# Patient Record
Sex: MALE | Race: OTHER | NOT HISPANIC OR LATINO | Employment: STUDENT | URBAN - METROPOLITAN AREA
[De-identification: names, ages, dates, MRNs, and addresses within clinical notes are randomized per-mention and may not be internally consistent; named-entity substitution may affect disease eponyms.]

---

## 2023-10-10 VITALS
BODY MASS INDEX: 23.99 KG/M2 | SYSTOLIC BLOOD PRESSURE: 117 MMHG | OXYGEN SATURATION: 98 % | HEIGHT: 65 IN | TEMPERATURE: 98.4 F | RESPIRATION RATE: 16 BRPM | DIASTOLIC BLOOD PRESSURE: 70 MMHG | WEIGHT: 144 LBS | HEART RATE: 98 BPM

## 2023-10-10 PROCEDURE — 96361 HYDRATE IV INFUSION ADD-ON: CPT

## 2023-10-10 PROCEDURE — 99284 EMERGENCY DEPT VISIT MOD MDM: CPT | Performed by: EMERGENCY MEDICINE

## 2023-10-10 PROCEDURE — 96374 THER/PROPH/DIAG INJ IV PUSH: CPT

## 2023-10-10 ASSESSMENT — COLUMBIA-SUICIDE SEVERITY RATING SCALE - C-SSRS
6. HAVE YOU EVER DONE ANYTHING, STARTED TO DO ANYTHING, OR PREPARED TO DO ANYTHING TO END YOUR LIFE?: NO
2. HAVE YOU ACTUALLY HAD ANY THOUGHTS OF KILLING YOURSELF?: NO
1. IN THE PAST MONTH, HAVE YOU WISHED YOU WERE DEAD OR WISHED YOU COULD GO TO SLEEP AND NOT WAKE UP?: NO

## 2023-10-10 ASSESSMENT — PAIN SCALES - GENERAL: PAINLEVEL_OUTOF10: 0 - NO PAIN

## 2023-10-10 ASSESSMENT — PAIN - FUNCTIONAL ASSESSMENT: PAIN_FUNCTIONAL_ASSESSMENT: 0-10

## 2023-10-11 ENCOUNTER — HOSPITAL ENCOUNTER (EMERGENCY)
Facility: HOSPITAL | Age: 23
Discharge: HOME | End: 2023-10-11
Attending: EMERGENCY MEDICINE
Payer: COMMERCIAL

## 2023-10-11 DIAGNOSIS — R11.2 NAUSEA AND VOMITING, UNSPECIFIED VOMITING TYPE: Primary | ICD-10-CM

## 2023-10-11 LAB
ALBUMIN SERPL BCP-MCNC: 4.7 G/DL (ref 3.4–5)
ALP SERPL-CCNC: 88 U/L (ref 33–120)
ALT SERPL W P-5'-P-CCNC: 17 U/L (ref 10–52)
ANION GAP SERPL CALC-SCNC: 12 MMOL/L (ref 10–20)
APPEARANCE UR: CLEAR
AST SERPL W P-5'-P-CCNC: 17 U/L (ref 9–39)
BASOPHILS # BLD AUTO: 0.04 X10*3/UL (ref 0–0.1)
BASOPHILS NFR BLD AUTO: 0.4 %
BILIRUB SERPL-MCNC: 0.5 MG/DL (ref 0–1.2)
BILIRUB UR STRIP.AUTO-MCNC: NEGATIVE MG/DL
BUN SERPL-MCNC: 17 MG/DL (ref 6–23)
CALCIUM SERPL-MCNC: 9.4 MG/DL (ref 8.6–10.3)
CHLORIDE SERPL-SCNC: 105 MMOL/L (ref 98–107)
CO2 SERPL-SCNC: 25 MMOL/L (ref 21–32)
COLOR UR: YELLOW
CREAT SERPL-MCNC: 1.06 MG/DL (ref 0.5–1.3)
EOSINOPHIL # BLD AUTO: 0.19 X10*3/UL (ref 0–0.7)
EOSINOPHIL NFR BLD AUTO: 1.8 %
ERYTHROCYTE [DISTWIDTH] IN BLOOD BY AUTOMATED COUNT: 12.5 % (ref 11.5–14.5)
GFR SERPL CREATININE-BSD FRML MDRD: >90 ML/MIN/1.73M*2
GLUCOSE SERPL-MCNC: 85 MG/DL (ref 74–99)
GLUCOSE UR STRIP.AUTO-MCNC: NEGATIVE MG/DL
HCT VFR BLD AUTO: 44.2 % (ref 41–52)
HGB BLD-MCNC: 15.9 G/DL (ref 13.5–17.5)
HOLD SPECIMEN: NORMAL
IMM GRANULOCYTES # BLD AUTO: 0.03 X10*3/UL (ref 0–0.7)
IMM GRANULOCYTES NFR BLD AUTO: 0.3 % (ref 0–0.9)
KETONES UR STRIP.AUTO-MCNC: NEGATIVE MG/DL
LEUKOCYTE ESTERASE UR QL STRIP.AUTO: NEGATIVE
LIPASE SERPL-CCNC: 41 U/L (ref 9–82)
LYMPHOCYTES # BLD AUTO: 3.94 X10*3/UL (ref 1.2–4.8)
LYMPHOCYTES NFR BLD AUTO: 37 %
MCH RBC QN AUTO: 31.4 PG (ref 26–34)
MCHC RBC AUTO-ENTMCNC: 36 G/DL (ref 32–36)
MCV RBC AUTO: 87 FL (ref 80–100)
MONOCYTES # BLD AUTO: 0.85 X10*3/UL (ref 0.1–1)
MONOCYTES NFR BLD AUTO: 8 %
NEUTROPHILS # BLD AUTO: 5.6 X10*3/UL (ref 1.2–7.7)
NEUTROPHILS NFR BLD AUTO: 52.5 %
NITRITE UR QL STRIP.AUTO: NEGATIVE
NRBC BLD-RTO: 0 /100 WBCS (ref 0–0)
PH UR STRIP.AUTO: 5 [PH]
PLATELET # BLD AUTO: 397 X10*3/UL (ref 150–450)
PMV BLD AUTO: 10.7 FL (ref 7.5–11.5)
POTASSIUM SERPL-SCNC: 3.7 MMOL/L (ref 3.5–5.3)
PROT SERPL-MCNC: 7.2 G/DL (ref 6.4–8.2)
PROT UR STRIP.AUTO-MCNC: NEGATIVE MG/DL
RBC # BLD AUTO: 5.06 X10*6/UL (ref 4.5–5.9)
RBC # UR STRIP.AUTO: NEGATIVE /UL
SODIUM SERPL-SCNC: 138 MMOL/L (ref 136–145)
SP GR UR STRIP.AUTO: 1.03
UROBILINOGEN UR STRIP.AUTO-MCNC: 2 MG/DL
WBC # BLD AUTO: 10.7 X10*3/UL (ref 4.4–11.3)

## 2023-10-11 PROCEDURE — 83690 ASSAY OF LIPASE: CPT | Performed by: EMERGENCY MEDICINE

## 2023-10-11 PROCEDURE — 2580000001 HC RX 258 IV SOLUTIONS: Performed by: EMERGENCY MEDICINE

## 2023-10-11 PROCEDURE — 81003 URINALYSIS AUTO W/O SCOPE: CPT | Performed by: EMERGENCY MEDICINE

## 2023-10-11 PROCEDURE — 2500000004 HC RX 250 GENERAL PHARMACY W/ HCPCS (ALT 636 FOR OP/ED): Performed by: EMERGENCY MEDICINE

## 2023-10-11 PROCEDURE — 36415 COLL VENOUS BLD VENIPUNCTURE: CPT | Performed by: EMERGENCY MEDICINE

## 2023-10-11 PROCEDURE — 84075 ASSAY ALKALINE PHOSPHATASE: CPT | Performed by: EMERGENCY MEDICINE

## 2023-10-11 PROCEDURE — 85025 COMPLETE CBC W/AUTO DIFF WBC: CPT | Performed by: EMERGENCY MEDICINE

## 2023-10-11 PROCEDURE — 80053 COMPREHEN METABOLIC PANEL: CPT | Performed by: EMERGENCY MEDICINE

## 2023-10-11 RX ORDER — ONDANSETRON HYDROCHLORIDE 2 MG/ML
4 INJECTION, SOLUTION INTRAVENOUS ONCE
Status: COMPLETED | OUTPATIENT
Start: 2023-10-11 | End: 2023-10-11

## 2023-10-11 RX ORDER — ONDANSETRON 4 MG/1
4 TABLET, ORALLY DISINTEGRATING ORAL EVERY 8 HOURS PRN
Qty: 15 TABLET | Refills: 0 | Status: SHIPPED | OUTPATIENT
Start: 2023-10-11 | End: 2023-10-21

## 2023-10-11 RX ADMIN — ONDANSETRON 4 MG: 2 INJECTION INTRAMUSCULAR; INTRAVENOUS at 06:26

## 2023-10-11 RX ADMIN — SODIUM CHLORIDE, POTASSIUM CHLORIDE, SODIUM LACTATE AND CALCIUM CHLORIDE 1000 ML: 600; 310; 30; 20 INJECTION, SOLUTION INTRAVENOUS at 05:24

## 2023-10-11 NOTE — ED PROVIDER NOTES
HPI   Chief Complaint   Patient presents with    Vomiting     Pt states he vomited 5 times today, pt states it began when he first woke up. Pt states he can't keep anything down and has been nauseous. Pt states last times he vomited was around 9:30 pm       HISTORY OF PRESENT ILLNESS:  Patient is a 23-year-old male who is otherwise healthy presenting to the emergency department with a chief complaint of nausea and vomiting.  Patient states that he starting at 10 AM had multiple episodes of nonbloody nonbilious emesis, 3 or 4 in a row.  He then had another episode when he attempted p.o.  His last emesis was at 10 PM.  Patient has not taken any medications.  He has had prior ED visits with nausea and vomiting but states this is worse.  He has no abdominal pain but states that he was having some vague pressure.    Past Medical History: Patient denies  Past Surgical History: Patient denies  Family History: family history not pertinent to presenting problem or chief complaint  Social History: Positive for cigarette smoking.  Denies EtOH use or marijuana use or other recreational drugs.    __________________________________________________________  PHYSICAL EXAM:    Appearance: Appears stated age male, alert, oriented , cooperative   Skin: Intact,  dry skin, no lesions, rash, petechiae or purpura.   Eyes: PERRLA, EOMs intact,  Conjunctiva pink with no redness or exudates.    HENT: Normocephalic, atraumatic. Nares patent   Neck: Supple. Trachea at midline.   Pulmonary: Lung sounds are clear bilaterally.  There is no rales, rhonchi, or wheezing.  Cardiac: Regular rate and rhythm, no rubs, murmurs, or gallops. No JVD,   Abdomen: Abdomen is soft, nontender, and nondistended.  No palpable organomegaly.  No rebound or guarding.  No CVA tenderness. Nonsurgical abdomen  Genitourinary: Exam deferred.  Musculoskeletal: no edema, pain, cyanosis, or deformity in extremities. Pulses full and equal.   Neurological:  Cranial nerves are  grossly intact, grossly normal sensation, no weakness, no focal findings identified.    __________________________________________________________  MEDICAL DECISION MAKING:    Patient was seen and examined. Differential diagnosis for nausea and vomiting includes gastritis, pancreatitis, cyclic vomiting syndrome, viral illness, biliary colic.  Patient has been having 1 day of nausea and vomiting.  His abdominal exam is benign.  His vital signs were within normal limits.  The patient will undergo a laboratory work-up including urinalysis.  He was treated with IV Zofran in addition to LR.  Patient work-up showed no evidence of a leukocytosis, no major electrolyte abnormality concerning for acute dehydration.  Lipase negative for pancreatitis.  The patient's urinalysis was negative.  He was able to tolerate orally afterwards.  Patient felt comfortable with being discharged at this time.  He was given a prescription of Zofran.  At time of discharge, I was concerned for H. pylori, with which she had a infection 14 years ago.  He was advised GI follow-up and provided referral.  He was given supportive care instructions, prescription for Zofran, and discharged home.      Willis Woodward  Emergency Medicine                            [place] Coma Scale Score: 15                  Patient History   History reviewed. No pertinent past medical history.  History reviewed. No pertinent surgical history.  No family history on file.  Social History     Tobacco Use    Smoking status: Not on file    Smokeless tobacco: Not on file   Substance Use Topics    Alcohol use: Not on file    Drug use: Not on file       Physical Exam   ED Triage Vitals [10/10/23 2347]   Temp Heart Rate Resp BP   36.9 °C (98.4 °F) 98 16 117/70      SpO2 Temp src Heart Rate Source Patient Position   98 % -- Monitor Sitting      BP Location FiO2 (%)     Left arm --       Physical Exam    [facility]   ED Course as of 10/11/23 1543   Wed Oct 11, 2023   0696  Patient was tolerating orally.  Felt comfortable with discharge.  Patient was given prescription for Zofran.  He mentioned that he was concerned about H. pylori, which she had a infection of 2 years ago.  He was given GI follow-up and return precautions. [WJ]      ED Course User Index  [WJ] Willis Woodward DO         Diagnoses as of 10/11/23 1543   Nausea and vomiting, unspecified vomiting type       Medical Decision Making      Procedure  Procedures     Willis Woodward DO  10/11/23 1541

## 2023-12-13 LAB — HOLD SPECIMEN: NORMAL

## 2024-04-01 ENCOUNTER — HOSPITAL ENCOUNTER (OUTPATIENT)
Dept: RADIOLOGY | Facility: EXTERNAL LOCATION | Age: 24
Discharge: HOME | End: 2024-04-01

## 2024-04-01 DIAGNOSIS — R10.9 STOMACH PAIN: ICD-10-CM

## 2024-09-10 ENCOUNTER — OFFICE VISIT (OUTPATIENT)
Dept: URGENT CARE | Age: 24
End: 2024-09-10
Payer: COMMERCIAL

## 2024-09-10 ENCOUNTER — HOSPITAL ENCOUNTER (EMERGENCY)
Facility: HOSPITAL | Age: 24
Discharge: HOME | End: 2024-09-10
Attending: EMERGENCY MEDICINE
Payer: COMMERCIAL

## 2024-09-10 VITALS
BODY MASS INDEX: 23.14 KG/M2 | TEMPERATURE: 98.2 F | OXYGEN SATURATION: 98 % | HEIGHT: 65 IN | DIASTOLIC BLOOD PRESSURE: 76 MMHG | WEIGHT: 138.89 LBS | HEART RATE: 75 BPM | RESPIRATION RATE: 16 BRPM | SYSTOLIC BLOOD PRESSURE: 123 MMHG

## 2024-09-10 VITALS
SYSTOLIC BLOOD PRESSURE: 140 MMHG | OXYGEN SATURATION: 98 % | HEART RATE: 56 BPM | TEMPERATURE: 98.4 F | DIASTOLIC BLOOD PRESSURE: 95 MMHG | RESPIRATION RATE: 16 BRPM

## 2024-09-10 DIAGNOSIS — R10.13 EPIGASTRIC PAIN: ICD-10-CM

## 2024-09-10 DIAGNOSIS — K92.0 HEMATEMESIS WITHOUT NAUSEA: Primary | ICD-10-CM

## 2024-09-10 DIAGNOSIS — K92.0 HEMATEMESIS OF FRESH BLOOD: Primary | ICD-10-CM

## 2024-09-10 LAB
ALBUMIN SERPL BCP-MCNC: 4.9 G/DL (ref 3.4–5)
ALP SERPL-CCNC: 95 U/L (ref 33–120)
ALT SERPL W P-5'-P-CCNC: 26 U/L (ref 10–52)
ANION GAP SERPL CALC-SCNC: 12 MMOL/L (ref 10–20)
APTT PPP: 31 SECONDS (ref 27–38)
AST SERPL W P-5'-P-CCNC: 16 U/L (ref 9–39)
BASOPHILS # BLD AUTO: 0.04 X10*3/UL (ref 0–0.1)
BASOPHILS NFR BLD AUTO: 0.4 %
BILIRUB SERPL-MCNC: 1 MG/DL (ref 0–1.2)
BUN SERPL-MCNC: 16 MG/DL (ref 6–23)
CALCIUM SERPL-MCNC: 9.6 MG/DL (ref 8.6–10.3)
CHLORIDE SERPL-SCNC: 104 MMOL/L (ref 98–107)
CO2 SERPL-SCNC: 25 MMOL/L (ref 21–32)
CREAT SERPL-MCNC: 0.98 MG/DL (ref 0.5–1.3)
EGFRCR SERPLBLD CKD-EPI 2021: >90 ML/MIN/1.73M*2
EOSINOPHIL # BLD AUTO: 0.08 X10*3/UL (ref 0–0.7)
EOSINOPHIL NFR BLD AUTO: 0.8 %
ERYTHROCYTE [DISTWIDTH] IN BLOOD BY AUTOMATED COUNT: 12.2 % (ref 11.5–14.5)
GLUCOSE SERPL-MCNC: 88 MG/DL (ref 74–99)
HCT VFR BLD AUTO: 46.9 % (ref 41–52)
HGB BLD-MCNC: 16.5 G/DL (ref 13.5–17.5)
IMM GRANULOCYTES # BLD AUTO: 0.03 X10*3/UL (ref 0–0.7)
IMM GRANULOCYTES NFR BLD AUTO: 0.3 % (ref 0–0.9)
INR PPP: 1.2 (ref 0.9–1.1)
LYMPHOCYTES # BLD AUTO: 2.71 X10*3/UL (ref 1.2–4.8)
LYMPHOCYTES NFR BLD AUTO: 28.5 %
MCH RBC QN AUTO: 30.6 PG (ref 26–34)
MCHC RBC AUTO-ENTMCNC: 35.2 G/DL (ref 32–36)
MCV RBC AUTO: 87 FL (ref 80–100)
MONOCYTES # BLD AUTO: 0.79 X10*3/UL (ref 0.1–1)
MONOCYTES NFR BLD AUTO: 8.3 %
NEUTROPHILS # BLD AUTO: 5.85 X10*3/UL (ref 1.2–7.7)
NEUTROPHILS NFR BLD AUTO: 61.7 %
NRBC BLD-RTO: 0 /100 WBCS (ref 0–0)
PLATELET # BLD AUTO: 412 X10*3/UL (ref 150–450)
POTASSIUM SERPL-SCNC: 3.7 MMOL/L (ref 3.5–5.3)
PROT SERPL-MCNC: 8.1 G/DL (ref 6.4–8.2)
PROTHROMBIN TIME: 13.4 SECONDS (ref 9.8–12.8)
RBC # BLD AUTO: 5.4 X10*6/UL (ref 4.5–5.9)
SODIUM SERPL-SCNC: 137 MMOL/L (ref 136–145)
WBC # BLD AUTO: 9.5 X10*3/UL (ref 4.4–11.3)

## 2024-09-10 PROCEDURE — 85730 THROMBOPLASTIN TIME PARTIAL: CPT | Performed by: EMERGENCY MEDICINE

## 2024-09-10 PROCEDURE — 99284 EMERGENCY DEPT VISIT MOD MDM: CPT | Mod: 25

## 2024-09-10 PROCEDURE — 84075 ASSAY ALKALINE PHOSPHATASE: CPT | Performed by: EMERGENCY MEDICINE

## 2024-09-10 PROCEDURE — 85025 COMPLETE CBC W/AUTO DIFF WBC: CPT | Performed by: EMERGENCY MEDICINE

## 2024-09-10 PROCEDURE — 96374 THER/PROPH/DIAG INJ IV PUSH: CPT

## 2024-09-10 PROCEDURE — 2500000004 HC RX 250 GENERAL PHARMACY W/ HCPCS (ALT 636 FOR OP/ED): Performed by: EMERGENCY MEDICINE

## 2024-09-10 PROCEDURE — 36415 COLL VENOUS BLD VENIPUNCTURE: CPT | Performed by: EMERGENCY MEDICINE

## 2024-09-10 PROCEDURE — 85610 PROTHROMBIN TIME: CPT | Performed by: EMERGENCY MEDICINE

## 2024-09-10 RX ORDER — OMEPRAZOLE 40 MG/1
40 CAPSULE, DELAYED RELEASE ORAL
Qty: 30 CAPSULE | Refills: 0 | Status: SHIPPED | OUTPATIENT
Start: 2024-09-10 | End: 2024-10-10

## 2024-09-10 RX ORDER — ONDANSETRON HYDROCHLORIDE 2 MG/ML
4 INJECTION, SOLUTION INTRAVENOUS ONCE
Status: COMPLETED | OUTPATIENT
Start: 2024-09-10 | End: 2024-09-10

## 2024-09-10 RX ORDER — ONDANSETRON HYDROCHLORIDE 8 MG/1
8 TABLET, FILM COATED ORAL EVERY 8 HOURS PRN
Qty: 15 TABLET | Refills: 0 | Status: SHIPPED | OUTPATIENT
Start: 2024-09-10

## 2024-09-10 RX ADMIN — ONDANSETRON 4 MG: 2 INJECTION INTRAMUSCULAR; INTRAVENOUS at 21:39

## 2024-09-10 ASSESSMENT — ENCOUNTER SYMPTOMS
VOMITING: 1
ABDOMINAL PAIN: 1
NAUSEA: 1
FEVER: 0
APPETITE CHANGE: 1
SHORTNESS OF BREATH: 0

## 2024-09-10 NOTE — PROGRESS NOTES
Subjective   Patient ID: Yocasta Serrato is a 23 y.o. male. They present today with a chief complaint of Abdominal Pain and Vomiting Blood (3 episodes of hematemesis in 3 weeks. Upper abdominal pain-worse in the morning.).    History of Present Illness  HPI:  Patient Identification  Yocasta Serrato is a 23 y.o. male.    Patient information was obtained from patient.  History/Exam limitations: none.      Chief Complaint   Abdominal Pain and Vomiting Blood (3 episodes of hematemesis in 3 weeks. Upper abdominal pain-worse in the morning.)      Patient presents for evaluation of abdominal pain and hematemesis. Onset of symptoms was abrupt starting 3 weeks ago with gradually worsening course since that time. The pain is located epigastric without radiation. The pain is rated as moderate. The pain is made worse by vomiting and is relieved by nothing. The patient also complains of vomiting. The patient denies diarrhea and fever. The pertinent past history includes GERD.             Abdominal Pain  Associated symptoms include nausea and vomiting. Pertinent negatives include no fever.       Past Medical History  Allergies as of 09/10/2024    (No Known Allergies)       (Not in a hospital admission)       No past medical history on file.    No past surgical history on file.         Review of Systems  Review of Systems   Constitutional:  Positive for appetite change. Negative for fever.   Respiratory:  Negative for shortness of breath.    Cardiovascular:  Negative for chest pain.   Gastrointestinal:  Positive for abdominal pain, nausea and vomiting.                                  Objective    Vitals:    09/10/24 1816   BP: (!) 140/95   Pulse: 56   Resp: 16   Temp: 36.9 °C (98.4 °F)   SpO2: 98%     No LMP for male patient.    Physical Exam  Constitutional:       General: He is not in acute distress.     Appearance: He is not toxic-appearing.   Cardiovascular:      Rate and Rhythm: Normal rate and regular  rhythm.   Pulmonary:      Effort: Pulmonary effort is normal.      Breath sounds: Normal breath sounds.   Abdominal:      Palpations: There is no mass.      Tenderness: There is abdominal tenderness in the epigastric area. There is no guarding or rebound.   Neurological:      Mental Status: He is alert.         Procedures    Point of Care Test & Imaging Results from this visit  Results for orders placed or performed during the hospital encounter of 10/11/23   CBC and Auto Differential   Result Value Ref Range    WBC 10.7 4.4 - 11.3 x10*3/uL    nRBC 0.0 0.0 - 0.0 /100 WBCs    RBC 5.06 4.50 - 5.90 x10*6/uL    Hemoglobin 15.9 13.5 - 17.5 g/dL    Hematocrit 44.2 41.0 - 52.0 %    MCV 87 80 - 100 fL    MCH 31.4 26.0 - 34.0 pg    MCHC 36.0 32.0 - 36.0 g/dL    RDW 12.5 11.5 - 14.5 %    Platelets 397 150 - 450 x10*3/uL    MPV 10.7 7.5 - 11.5 fL    Neutrophils % 52.5 40.0 - 80.0 %    Immature Granulocytes %, Automated 0.3 0.0 - 0.9 %    Lymphocytes % 37.0 13.0 - 44.0 %    Monocytes % 8.0 2.0 - 10.0 %    Eosinophils % 1.8 0.0 - 6.0 %    Basophils % 0.4 0.0 - 2.0 %    Neutrophils Absolute 5.60 1.20 - 7.70 x10*3/uL    Immature Granulocytes Absolute, Automated 0.03 0.00 - 0.70 x10*3/uL    Lymphocytes Absolute 3.94 1.20 - 4.80 x10*3/uL    Monocytes Absolute 0.85 0.10 - 1.00 x10*3/uL    Eosinophils Absolute 0.19 0.00 - 0.70 x10*3/uL    Basophils Absolute 0.04 0.00 - 0.10 x10*3/uL   Comprehensive metabolic panel   Result Value Ref Range    Glucose 85 74 - 99 mg/dL    Sodium 138 136 - 145 mmol/L    Potassium 3.7 3.5 - 5.3 mmol/L    Chloride 105 98 - 107 mmol/L    Bicarbonate 25 21 - 32 mmol/L    Anion Gap 12 10 - 20 mmol/L    Urea Nitrogen 17 6 - 23 mg/dL    Creatinine 1.06 0.50 - 1.30 mg/dL    eGFR >90 >60 mL/min/1.73m*2    Calcium 9.4 8.6 - 10.3 mg/dL    Albumin 4.7 3.4 - 5.0 g/dL    Alkaline Phosphatase 88 33 - 120 U/L    Total Protein 7.2 6.4 - 8.2 g/dL    AST 17 9 - 39 U/L    Bilirubin, Total 0.5 0.0 - 1.2 mg/dL    ALT 17 10 -  52 U/L   Lipase   Result Value Ref Range    Lipase 41 9 - 82 U/L   Urinalysis with Reflex Microscopic and Culture   Result Value Ref Range    Color, Urine Yellow Straw, Yellow    Appearance, Urine Clear Clear    Specific Gravity, Urine 1.026 1.005 - 1.035    pH, Urine 5.0 5.0, 5.5, 6.0, 6.5, 7.0, 7.5, 8.0    Protein, Urine NEGATIVE NEGATIVE mg/dL    Glucose, Urine NEGATIVE NEGATIVE mg/dL    Blood, Urine NEGATIVE NEGATIVE    Ketones, Urine NEGATIVE NEGATIVE mg/dL    Bilirubin, Urine NEGATIVE NEGATIVE    Urobilinogen, Urine 2.0 (N) <2.0 mg/dL    Nitrite, Urine NEGATIVE NEGATIVE    Leukocyte Esterase, Urine NEGATIVE NEGATIVE   Extra Urine Gray Tube   Result Value Ref Range    Extra Tube Hold for add-ons.    Green Top   Result Value Ref Range    Extra Tube Hold for add-ons.      No results found.    Diagnostic study results (if any) were reviewed by Ileana Alford PA-C.    Assessment/Plan   Allergies, medications, history, and pertinent labs/EKGs/Imaging reviewed by Ileana Alford PA-C.     Medical Decision Making  MDM- Patient with signs and symptoms consistent with abdominal pain that cannot be properly assessed in office and is stable. Patient referred to ED for further evaluation and testing. Patient will be transferred via Private Vehicle to [facility].       Orders and Diagnoses  Diagnoses and all orders for this visit:  Hematemesis of fresh blood  Epigastric pain      Medical Admin Record      Follow Up Instructions  No follow-ups on file.    Patient disposition: ED    Electronically signed by Ileana Alford PA-C  6:38 PM

## 2024-09-10 NOTE — ED TRIAGE NOTES
Was seen at  for vomitting blood and told to come to ER. Noticed blood 3 weeks ago and thought it was from his mouth, Now sees more. PT A&Ox4, GCS 15, Airway patent.

## 2024-09-11 NOTE — ED PROVIDER NOTES
HPI   Chief Complaint   Patient presents with    Vomiting     Was seen at  for vomitting blood and told to come to ER. Noticed blood 3 weeks ago and thought it was from his mouth, Now sees more. Also c/o upper abdominal pain that is worse after waking up.        Chief complaint: Vomiting blood    History of present illness: Patient is a 23-year-old male presenting to the emergency department after experiencing hematemesis.  According to the patient, his symptoms been ongoing for the past several weeks.  The patient states that he takes no blood thinners.  The patient denies any pain with this.  The patient states that he has episodes of vomiting followed by blood.  Recently, over the past day and a half the patient has had recurrent symptoms of blood and as result the patient presents to the emergency department for further evaluation.  He states that he otherwise feels well.  He denies any melanic stool or hematochezia.      History provided by:  Patient   used: No            Patient History   History reviewed. No pertinent past medical history.  History reviewed. No pertinent surgical history.  No family history on file.  Social History     Tobacco Use    Smoking status: Every Day     Current packs/day: 1.00     Types: Cigarettes    Smokeless tobacco: Never   Substance Use Topics    Alcohol use: Never    Drug use: Not Currently       Physical Exam   ED Triage Vitals [09/10/24 1911]   Temperature Heart Rate Respirations BP   36.8 °C (98.2 °F) 75 16 123/76      Pulse Ox Temp src Heart Rate Source Patient Position   98 % -- -- --      BP Location FiO2 (%)     -- --       Physical Exam  Vitals and nursing note reviewed.   Constitutional:       General: He is not in acute distress.     Appearance: He is well-developed.   HENT:      Head: Normocephalic and atraumatic.   Eyes:      Conjunctiva/sclera: Conjunctivae normal.   Cardiovascular:      Rate and Rhythm: Normal rate and regular rhythm.       Heart sounds: No murmur heard.  Pulmonary:      Effort: Pulmonary effort is normal. No respiratory distress.      Breath sounds: Normal breath sounds.   Abdominal:      Palpations: Abdomen is soft.      Tenderness: There is no abdominal tenderness.   Musculoskeletal:         General: No swelling.      Cervical back: Neck supple.   Skin:     General: Skin is warm and dry.      Capillary Refill: Capillary refill takes less than 2 seconds.   Neurological:      Mental Status: He is alert.   Psychiatric:         Mood and Affect: Mood normal.           ED Course & MDM   Diagnoses as of 09/13/24 1914   Hematemesis without nausea                 No data recorded     [place] Coma Scale Score: 15 (09/10/24 1912 : Shilo Cuellar, JAN)                           Medical Decision Making  Medical Decision Making: Patient remained stable during his time in the emergency department.  CBC demonstrated no significant abnormalities Chem-7 and LFTs were all within normal limits INR is 1.2 PTT was within normal limits.    Patient presents to the emergency department with complaints of hematemesis.  Workup was performed as above.  The patient was given Zofran IV.  The patient was reassured that his workup is negative at this time.  The patient was given a prescription for omeprazole as well as Zofran for home use.  At this time, the patient's workup is otherwise normal and the patient appears well the patient had no episodes of hematemesis during his time in the emergency department as result I am comfortable with the patient following up with gastroenterology as an outpatient.  Referral was placed.  Patient was then discharged home in otherwise stable condition.    Amount and/or Complexity of Data Reviewed  Labs: ordered. Decision-making details documented in ED Course.        Procedure  Procedures     Scar Schmid MD  09/13/24 1916

## 2024-10-17 ENCOUNTER — OFFICE VISIT (OUTPATIENT)
Dept: URGENT CARE | Age: 24
End: 2024-10-17
Payer: COMMERCIAL

## 2024-10-17 VITALS
OXYGEN SATURATION: 98 % | TEMPERATURE: 96.4 F | SYSTOLIC BLOOD PRESSURE: 133 MMHG | RESPIRATION RATE: 18 BRPM | DIASTOLIC BLOOD PRESSURE: 88 MMHG | HEART RATE: 69 BPM

## 2024-10-17 DIAGNOSIS — R11.2 NAUSEA AND VOMITING, UNSPECIFIED VOMITING TYPE: Primary | ICD-10-CM

## 2024-10-17 LAB
POC RAPID INFLUENZA A: NEGATIVE
POC RAPID INFLUENZA B: NEGATIVE
POC RAPID STREP: NEGATIVE
POC SARS-COV-2 AG BINAX: NORMAL

## 2024-10-17 RX ORDER — ONDANSETRON 4 MG/1
4 TABLET, ORALLY DISINTEGRATING ORAL ONCE
Status: COMPLETED | OUTPATIENT
Start: 2024-10-17 | End: 2024-10-17

## 2024-10-17 RX ORDER — ONDANSETRON 4 MG/1
8 TABLET, ORALLY DISINTEGRATING ORAL 2 TIMES DAILY PRN
Qty: 20 TABLET | Refills: 0 | Status: SHIPPED | OUTPATIENT
Start: 2024-10-17 | End: 2024-10-22

## 2024-10-17 ASSESSMENT — ENCOUNTER SYMPTOMS
DYSURIA: 0
NAUSEA: 1
ABDOMINAL PAIN: 0
VOMITING: 1
COUGH: 1
FEVER: 1
SORE THROAT: 0
DIARRHEA: 0
WHEEZING: 0
HEADACHES: 1

## 2024-10-17 NOTE — PROGRESS NOTES
Subjective   Patient ID: Yocasta Serrato is a 24 y.o. male. They present today with a chief complaint of Fever (2 days), Headache (2 days), and Vomiting (Off and on for 1 week).    History of Present Illness    History provided by:  Patient   used: No    Fever   This is a new problem. Episode onset: 2 days. The problem occurs intermittently. The problem has been waxing and waning. His temperature was unmeasured prior to arrival. Associated symptoms include congestion, coughing, headaches, muscle aches, nausea and vomiting. Pertinent negatives include no abdominal pain, chest pain, diarrhea, ear pain, rash, sleepiness, sore throat, urinary pain or wheezing. Associated symptoms comments: GERD. Treatments tried: omeprazole, ibuprofen. The treatment provided mild relief.   Risk factors: no contaminated food, no contaminated water, no hx of cancer, no immunosuppression, no occupational exposure, no recent sickness, no recent travel and no sick contacts    Headache  Associated symptoms: congestion, cough, fever, nausea and vomiting    Associated symptoms: no abdominal pain, no diarrhea, no ear pain and no sore throat    Vomiting  Associated symptoms: cough, fever and headaches    Associated symptoms: no abdominal pain, no diarrhea and no sore throat        Past Medical History  Allergies as of 10/17/2024    (No Known Allergies)       (Not in a hospital admission)       No past medical history on file.    No past surgical history on file.     reports that he has been smoking cigarettes. He has never used smokeless tobacco. He reports that he does not currently use drugs. He reports that he does not drink alcohol.    Review of Systems  Review of Systems   Constitutional:  Positive for fever.   HENT:  Positive for congestion. Negative for ear pain and sore throat.    Respiratory:  Positive for cough. Negative for wheezing.    Cardiovascular:  Negative for chest pain.   Gastrointestinal:  Positive  for nausea and vomiting. Negative for abdominal pain and diarrhea.   Genitourinary:  Negative for dysuria.   Skin:  Negative for rash.   Neurological:  Positive for headaches.                                  Objective    Vitals:    10/17/24 1928   BP: 133/88   Pulse: 69   Resp: 18   Temp: 35.8 °C (96.4 °F)   SpO2: 98%     No LMP for male patient.    Physical Exam  Vitals and nursing note reviewed.   Constitutional:       Appearance: Normal appearance.   HENT:      Head: Normocephalic and atraumatic.      Right Ear: Hearing, tympanic membrane, ear canal and external ear normal.      Left Ear: Hearing, tympanic membrane, ear canal and external ear normal.      Nose: Mucosal edema and congestion present. No nasal deformity, septal deviation, signs of injury, laceration, nasal tenderness or rhinorrhea.      Right Sinus: No maxillary sinus tenderness or frontal sinus tenderness.      Left Sinus: No maxillary sinus tenderness or frontal sinus tenderness.      Mouth/Throat:      Lips: Pink.      Mouth: Mucous membranes are moist.      Pharynx: Uvula midline. Posterior oropharyngeal erythema present.      Tonsils: No tonsillar exudate or tonsillar abscesses.   Cardiovascular:      Rate and Rhythm: Normal rate and regular rhythm.      Heart sounds: Normal heart sounds.   Pulmonary:      Effort: Pulmonary effort is normal.      Breath sounds: Normal breath sounds and air entry.   Abdominal:      General: Abdomen is flat. Bowel sounds are increased. There is no distension.      Palpations: There is no mass.      Tenderness: There is no abdominal tenderness. There is no guarding or rebound.      Hernia: No hernia is present.      Comments: Hyperactive bowel sounds in all quadrants.    Musculoskeletal:      Cervical back: Normal range of motion and neck supple.   Lymphadenopathy:      Cervical: No cervical adenopathy.   Neurological:      Mental Status: He is alert.   Psychiatric:         Mood and Affect: Mood normal.          Behavior: Behavior normal.         Procedures    Point of Care Test & Imaging Results from this visit  Results for orders placed or performed in visit on 10/17/24   POCT Covid-19 Rapid Antigen   Result Value Ref Range    POC JUDITH-COV-2 AG  Presumptive negative test for SARS-CoV-2 (no antigen detected)     Presumptive negative test for SARS-CoV-2 (no antigen detected)   POCT Influenza A/B manually resulted   Result Value Ref Range    POC Rapid Influenza A Negative Negative    POC Rapid Influenza B Negative Negative   POCT rapid strep A manually resulted   Result Value Ref Range    POC Rapid Strep Negative Negative      No results found.    Diagnostic study results (if any) were reviewed by JEREMY Douglas.    Assessment/Plan   Allergies, medications, history, and pertinent labs/EKGs/Imaging reviewed by JEREMY Douglas.     Medical Decision Making  Encourage fluid and bland diet. Advance diet as tolerated.  Continue omeprazole.  Take Zofran as needed for nauseand/or vomiting.  Return or f/u with PCP if symptoms worsened and/or did not improve in 3-5 days.  If symptoms worsened anytime, to call 911 or go to the nearest ER.   Pt verbalized understanding of the instructions and left in stable condition.      Orders and Diagnoses  Diagnoses and all orders for this visit:  Nausea and vomiting, unspecified vomiting type  -     ondansetron ODT (Zofran-ODT) disintegrating tablet 4 mg  -     ondansetron ODT (Zofran-ODT) 4 mg disintegrating tablet; Take 2 tablets (8 mg) by mouth 2 times a day as needed for nausea or vomiting for up to 5 days.  -     ondansetron ODT (Zofran-ODT) disintegrating tablet 4 mg  -     POCT Covid-19 Rapid Antigen  -     POCT Influenza A/B manually resulted  -     POCT rapid strep A manually resulted      Medical Admin Record  Administrations This Visit       ondansetron ODT (Zofran-ODT) disintegrating tablet 4 mg       Admin Date  10/17/2024 Action  Given Dose  4 mg Route  oral Documented  By  Rod Dodge RN               Admin Date  10/17/2024 Action  Given Dose  4 mg Route  oral Documented By  Rod Dodge RN                    Patient disposition: Home    Electronically signed by JEREMY Douglas  8:32 PM

## 2024-10-17 NOTE — LETTER
October 17, 2024     Patient: Yocasta Serrato   YOB: 2000   Date of Visit: 10/17/2024       To Whom It May Concern:    Yocasta Serrato was seen in my clinic on 10/17/2024 at 7:15 pm. Please excuse Yocasta for his absence from school on this day to make the appointment. May return to school 10/18/2024    If you have any questions or concerns, please don't hesitate to call.         Sincerely,         HERMILA Douglas-CNP        CC: No Recipients

## 2024-10-18 NOTE — PATIENT INSTRUCTIONS
Encourage fluid and bland diet. Advance diet as tolerated.  Continue omeprazole.  Take Zofran as needed for nauseand/or vomiting.  Return or f/u with PCP if symptoms worsened and/or did not improve in 3-5 days.  If symptoms worsened anytime, to call 911 or go to the nearest ER.

## 2025-04-15 ENCOUNTER — OFFICE VISIT (OUTPATIENT)
Dept: URGENT CARE | Age: 25
End: 2025-04-15
Payer: COMMERCIAL

## 2025-04-15 VITALS
RESPIRATION RATE: 16 BRPM | OXYGEN SATURATION: 99 % | TEMPERATURE: 97.7 F | WEIGHT: 154 LBS | HEIGHT: 64 IN | HEART RATE: 83 BPM | DIASTOLIC BLOOD PRESSURE: 112 MMHG | SYSTOLIC BLOOD PRESSURE: 149 MMHG | BODY MASS INDEX: 26.29 KG/M2

## 2025-04-15 DIAGNOSIS — R19.7 DIARRHEA, UNSPECIFIED TYPE: Primary | ICD-10-CM

## 2025-04-15 DIAGNOSIS — R10.84 GENERALIZED ABDOMINAL PAIN: ICD-10-CM

## 2025-04-15 DIAGNOSIS — R03.0 ELEVATED BLOOD PRESSURE READING WITHOUT DIAGNOSIS OF HYPERTENSION: ICD-10-CM

## 2025-04-15 DIAGNOSIS — R31.9 HEMATURIA, UNSPECIFIED TYPE: ICD-10-CM

## 2025-04-15 LAB
POC APPEARANCE, URINE: CLEAR
POC BILIRUBIN, URINE: NEGATIVE
POC BLOOD, URINE: ABNORMAL
POC COLOR, URINE: YELLOW
POC GLUCOSE, URINE: NEGATIVE MG/DL
POC KETONES, URINE: NEGATIVE MG/DL
POC LEUKOCYTES, URINE: NEGATIVE
POC NITRITE,URINE: NEGATIVE
POC PH, URINE: 6 PH
POC PROTEIN, URINE: NEGATIVE MG/DL
POC SPECIFIC GRAVITY, URINE: 1.02
POC UROBILINOGEN, URINE: 0.2 EU/DL

## 2025-04-15 PROCEDURE — 81003 URINALYSIS AUTO W/O SCOPE: CPT | Performed by: NURSE PRACTITIONER

## 2025-04-15 PROCEDURE — 3008F BODY MASS INDEX DOCD: CPT | Performed by: NURSE PRACTITIONER

## 2025-04-15 PROCEDURE — 99213 OFFICE O/P EST LOW 20 MIN: CPT | Performed by: NURSE PRACTITIONER

## 2025-04-15 RX ORDER — DICYCLOMINE HYDROCHLORIDE 20 MG/1
20 TABLET ORAL 4 TIMES DAILY PRN
Qty: 20 TABLET | Refills: 0 | Status: SHIPPED | OUTPATIENT
Start: 2025-04-15 | End: 2025-04-20

## 2025-04-15 ASSESSMENT — PATIENT HEALTH QUESTIONNAIRE - PHQ9
1. LITTLE INTEREST OR PLEASURE IN DOING THINGS: NOT AT ALL
2. FEELING DOWN, DEPRESSED OR HOPELESS: NOT AT ALL
SUM OF ALL RESPONSES TO PHQ9 QUESTIONS 1 AND 2: 0

## 2025-04-15 ASSESSMENT — ENCOUNTER SYMPTOMS
ARTHRALGIAS: 0
OCCASIONAL FEELINGS OF UNSTEADINESS: 0
RECENT COUGH: 0
DIARRHEA: 1
ABDOMINAL PAIN: 1
DEPRESSION: 0
CHILLS: 0
FEVER: 0
VOMITING: 0
DIAPHORESIS: 0
MYALGIAS: 0
LOSS OF SENSATION IN FEET: 0
HEADACHES: 0

## 2025-04-15 NOTE — PATIENT INSTRUCTIONS
Encourage fluid and bland diet. Advance diet as tolerated.  Take Peptobismol as needed for diarrhea.  If Peptobismal does not work, take Imodium as needed.   Stool study.  Take Bentyl as instructed for the abdominal pain.   Referred to GI.  Referred to urology for hematuria.  Follow up with GI and/or with PCP if symptoms worsened and/or did not improve in 3-5 days.  If symptoms worsened anytime, to call 911 or go to the nearest ER.   Elevated elevated blood pressure readings.  Advised to avoid NSAID and pseudoephedrine and recheck blood pressure at home.  If your blood pressure remains equal to or greater than 140/90, to follow up with primary care provider.

## 2025-04-15 NOTE — PROGRESS NOTES
Subjective   Patient ID: Yocasta Serrato is a 24 y.o. male. They present today with a chief complaint of Diarrhea (C/O Diarrhea for the last week. Pt will have around 8 water BM/day. Pt denies pain or straining with BM. Pt will have some periodic blood on paper but has not noticed any in bowel. Pt denies any changes to his diet or new medications. ).    History of Present Illness    History provided by:  Patient   used: No    Diarrhea  Quality:  Watery  Severity:  Severe  Onset quality:  Sudden  Number of episodes:  10 episodes over the last 2 days  Duration:  10 days  Timing:  Constant  Progression:  Worsening  Relieved by:  None tried  Worsened by:  Nothing (Pt has diarrhea regardless whether he eats or drinks)  Ineffective treatments:  None tried  Associated symptoms: abdominal pain    Associated symptoms: no arthralgias, no chills, no recent cough, no diaphoresis, no fever, no headaches, no myalgias, no URI and no vomiting    Risk factors: no recent antibiotic use, no sick contacts, no suspicious food intake and no travel to endemic areas    Risk factors comment:  GERD controlled on Nexium      Past Medical History  Allergies as of 04/15/2025    (No Known Allergies)       (Not in a hospital admission)       History reviewed. No pertinent past medical history.    History reviewed. No pertinent surgical history.     reports that he has been smoking cigarettes. He has never used smokeless tobacco. He reports that he does not currently use drugs. He reports that he does not drink alcohol.    Review of Systems  Review of Systems   Constitutional:  Negative for chills, diaphoresis and fever.   Gastrointestinal:  Positive for abdominal pain and diarrhea. Negative for vomiting.   Musculoskeletal:  Negative for arthralgias and myalgias.   Neurological:  Negative for headaches.          Objective    Vitals:    04/15/25 0926   BP: (!) 149/112   BP Location: Left arm   Patient Position: Sitting  "  BP Cuff Size: Adult   Pulse: 83   Resp: 16   Temp: 36.5 °C (97.7 °F)   TempSrc: Oral   SpO2: 99%   Weight: 69.9 kg (154 lb)   Height: 1.626 m (5' 4\")     No LMP for male patient.    Physical Exam  Vitals and nursing note reviewed.   Constitutional:       Appearance: Normal appearance.   HENT:      Head: Normocephalic and atraumatic.   Cardiovascular:      Rate and Rhythm: Normal rate and regular rhythm.   Pulmonary:      Effort: Pulmonary effort is normal.      Breath sounds: Normal breath sounds.   Abdominal:      General: Abdomen is flat. Bowel sounds are increased. There is no distension.      Palpations: There is no mass.      Tenderness: There is generalized abdominal tenderness. There is no guarding or rebound.      Hernia: No hernia is present.      Comments: Hyperactive bowel sounds in all quadrants.     Neurological:      Mental Status: He is alert.         Procedures    Point of Care Test & Imaging Results from this visit  Results for orders placed or performed in visit on 04/15/25   POCT UA Automated manually resulted   Result Value Ref Range    POC Color, Urine Yellow Straw, Yellow, Light-Yellow    POC Appearance, Urine Clear Clear    POC Glucose, Urine NEGATIVE NEGATIVE mg/dl    POC Bilirubin, Urine NEGATIVE NEGATIVE    POC Ketones, Urine NEGATIVE NEGATIVE mg/dl    POC Specific Gravity, Urine 1.020 1.005 - 1.035    POC Blood, Urine TRACE-Intact (A) NEGATIVE    POC PH, Urine 6.0 No Reference Range Established PH    POC Protein, Urine NEGATIVE NEGATIVE mg/dl    POC Urobilinogen, Urine 0.2 0.2, 1.0 EU/DL    Poc Nitrite, Urine NEGATIVE NEGATIVE    POC Leukocytes, Urine NEGATIVE NEGATIVE      Imaging  No results found.    Cardiology, Vascular, and Other Imaging  No other imaging results found for the past 2 days      Diagnostic study results (if any) were reviewed by HERMILA Douglas-ALICE.    Assessment/Plan   Allergies, medications, history, and pertinent labs/EKGs/Imaging reviewed by Dick Trujillo, " APRN-CNP.     Medical Decision Making  Ddx: Viral gastroenteritis, IBS  Encourage fluid and bland diet. Advance diet as tolerated.  Pt has had diarrhea for 10 days, but did not try OTC meds at all.   Take Peptobismol as needed for diarrhea.  If Peptobismal does not work, take Imodium as needed.   Stool study.  Take Bentyl as instructed for the abdominal pain.   Referred to GI.  Referred to urology for hematuria.  Follow up with GI and/or with PCP if symptoms worsened and/or did not improve in 3-5 days.  If symptoms worsened anytime, to call 911 or go to the nearest ER.   Elevated elevated blood pressure readings.  Advised to avoid NSAID and pseudoephedrine and recheck blood pressure at home.  If your blood pressure remains equal to or greater than 140/90, to follow up with primary care provider.   Pt verbalized understanding of the instructions and left in stable condition.      Orders and Diagnoses  Diagnoses and all orders for this visit:  Diarrhea, unspecified type  -     Cryptosporidium Antigen, Stool  -     Ova/Para + Giardia/Cryptosporidium Antigen  -     C. difficile, PCR  -     Stool Pathogen Panel, PCR  -     Referral to Gastroenterology; Future  Generalized abdominal pain  -     dicyclomine (Bentyl) 20 mg tablet; Take 1 tablet (20 mg) by mouth 4 times a day as needed (abdominal pain) for up to 5 days.  -     Cryptosporidium Antigen, Stool  -     Ova/Para + Giardia/Cryptosporidium Antigen  -     C. difficile, PCR  -     Stool Pathogen Panel, PCR  -     POCT UA Automated manually resulted  -     Urine Culture  -     Referral to Gastroenterology; Future  Elevated blood pressure reading without diagnosis of hypertension  Hematuria, unspecified type  -     Referral to Urology; Future      Medical Admin Record      Patient disposition: Home    Electronically signed by JEREMY Douglas  1:21 PM

## 2025-04-15 NOTE — LETTER
April 15, 2025     Patient: Yocasta Serrato   YOB: 2000   Date of Visit: 4/15/2025       To Whom It May Concern:    Yocasta Serrato was seen in my clinic on 4/15/2025 at 9:15 am. Please excuse Yocasta for his absence from school on this day to make the appointment. Patient may return to school 04/18/2025.    If you have any questions or concerns, please don't hesitate to call.         Sincerely,         HERMILA Douglas-CNP        CC: No Recipients

## 2025-04-17 LAB — BACTERIA UR CULT: NORMAL

## 2025-09-02 ENCOUNTER — OFFICE VISIT (OUTPATIENT)
Dept: URGENT CARE | Age: 25
End: 2025-09-02
Payer: COMMERCIAL

## 2025-09-02 VITALS
OXYGEN SATURATION: 98 % | DIASTOLIC BLOOD PRESSURE: 81 MMHG | SYSTOLIC BLOOD PRESSURE: 146 MMHG | RESPIRATION RATE: 16 BRPM | TEMPERATURE: 98.1 F | HEART RATE: 71 BPM

## 2025-09-02 DIAGNOSIS — J02.9 SORE THROAT: ICD-10-CM

## 2025-09-02 LAB
POC HUMAN RHINOVIRUS PCR: NEGATIVE
POC INFLUENZA A VIRUS PCR: NEGATIVE
POC INFLUENZA B VIRUS PCR: NEGATIVE
POC RAPID MONO: NEGATIVE
POC RESPIRATORY SYNCYTIAL VIRUS PCR: NEGATIVE
POC STREPTOCOCCUS PYOGENES (GROUP A STREP) PCR: NEGATIVE

## 2025-09-02 PROCEDURE — 99213 OFFICE O/P EST LOW 20 MIN: CPT

## 2025-09-02 PROCEDURE — 86308 HETEROPHILE ANTIBODY SCREEN: CPT

## 2025-09-02 PROCEDURE — 87651 STREP A DNA AMP PROBE: CPT

## 2025-09-02 PROCEDURE — 87631 RESP VIRUS 3-5 TARGETS: CPT

## 2025-09-02 ASSESSMENT — ENCOUNTER SYMPTOMS
SORE THROAT: 1
CARDIOVASCULAR NEGATIVE: 1
CHILLS: 1
RESPIRATORY NEGATIVE: 1